# Patient Record
Sex: MALE | HISPANIC OR LATINO | URBAN - METROPOLITAN AREA
[De-identification: names, ages, dates, MRNs, and addresses within clinical notes are randomized per-mention and may not be internally consistent; named-entity substitution may affect disease eponyms.]

---

## 2019-12-07 ENCOUNTER — OFFICE VISIT (OUTPATIENT)
Dept: RETAIL CLINIC | Facility: CLINIC | Age: 24
End: 2019-12-07

## 2019-12-07 VITALS
DIASTOLIC BLOOD PRESSURE: 68 MMHG | HEIGHT: 66 IN | OXYGEN SATURATION: 97 % | SYSTOLIC BLOOD PRESSURE: 110 MMHG | BODY MASS INDEX: 24.11 KG/M2 | WEIGHT: 150 LBS | TEMPERATURE: 98.3 F | HEART RATE: 96 BPM | RESPIRATION RATE: 20 BRPM

## 2019-12-07 DIAGNOSIS — J02.0 ACUTE STREPTOCOCCAL PHARYNGITIS: Primary | ICD-10-CM

## 2019-12-07 LAB
EXPIRATION DATE: ABNORMAL
INTERNAL CONTROL: ABNORMAL
Lab: ABNORMAL
S PYO AG THROAT QL: POSITIVE

## 2019-12-07 PROCEDURE — 99213 OFFICE O/P EST LOW 20 MIN: CPT | Performed by: NURSE PRACTITIONER

## 2019-12-07 PROCEDURE — 87880 STREP A ASSAY W/OPTIC: CPT | Performed by: NURSE PRACTITIONER

## 2019-12-07 RX ORDER — ONDANSETRON 4 MG/1
4 TABLET, ORALLY DISINTEGRATING ORAL EVERY 8 HOURS PRN
Qty: 8 TABLET | Refills: 0 | Status: SHIPPED | OUTPATIENT
Start: 2019-12-07

## 2019-12-07 RX ORDER — AMOXICILLIN 500 MG/1
1000 TABLET, FILM COATED ORAL 2 TIMES DAILY
Qty: 40 TABLET | Refills: 0 | Status: SHIPPED | OUTPATIENT
Start: 2019-12-07 | End: 2019-12-17

## 2019-12-07 RX ORDER — BENZONATATE 200 MG/1
200 CAPSULE ORAL 3 TIMES DAILY PRN
Qty: 30 CAPSULE | Refills: 0 | Status: SHIPPED | OUTPATIENT
Start: 2019-12-07

## 2019-12-07 RX ORDER — GUAIFENESIN 600 MG/1
1200 TABLET, EXTENDED RELEASE ORAL 2 TIMES DAILY PRN
Qty: 40 TABLET | Refills: 0 | Status: SHIPPED | OUTPATIENT
Start: 2019-12-07 | End: 2019-12-17

## 2019-12-07 NOTE — PATIENT INSTRUCTIONS
Complete the full course of antibiotic, even if you are feeling better. Increase fluids and rest. Use tylenol or ibuprofen for the fever and pain. Eat a soft diet until the pain resolves. Use salt water gargles and throat losenges for pain relief. You are considered contagious for 24 hours following the start of antibiotics. Use good handwashing and do not allow anyone to drink after you. After 48 hours, you should replace your toothbrush or soak it in mouth wash to prevent reinfection. Seek immediate medical attention if the pain becomes severe, or you have trouble breathing, drooling, or great difficulty swallowing.       Dolor de garganta  Sore Throat  El dolor de garganta es dolor, ardor, irritación o sensación de picazón en la garganta. Cuando usted tiene dolor de garganta, puede sentir molestia o dolor en la garganta cuando traga o habla.  Muchas cosas pueden causar dolor de garganta, entre ellas:  · Infección.  · Alergias estacionales.  · La sequedad en el aire.  · Algunos irritantes, hali el humo o la contaminación.  · Tratamiento con radiación en la mya.  · Enfermedad de reflujo gastroesofágico (ERGE).  · Un tumor.  Generalmente, el dolor de garganta es el primer signo de otra enfermedad. Un dolor de garganta puede estar acompañado de otros síntomas, hali tos, estornudos, fiebre y ganglios hinchados en el adrianna. La mayor parte de los jimbo de garganta desaparecen sin tratamiento médico.  Siga estas indicaciones en toure casa:         · [place] los medicamentos de venta sayra solamente hali se lo haya indicado el médico.  ? Si el nataliia tiene dolor de garganta, no le administre aspirina, debido al riesgo de que contraiga el síndrome de [place].  · Aarti suficiente líquido para mantener la orina de color amarillo pálido.  · Descanse todo lo que sea necesario.  · Para ayudar a aliviar el dolor, intente lo siguiente:  ? Aarti líquidos calientes, hali caldos, infusiones o [place].  ? También puede comer o beber  líquidos fríos o congelados, tales hali paletas de hielo congelado.  ? Blanche gárgaras con heather mezcla de agua y sal 3 o 4 veces al día, o cuando sea necesario. Para preparar la mezcla de agua y sal, disuelva totalmente de ½ a 1 cucharadita (de 3 a 6 g) de sal en 1 taza (237 ml) de agua tibia.  ? Chupe caramelos duros o pastillas para la garganta.  ? Ponga un humidificador de vapor frío en la habitación por la noche para humedecer el aire.  ? También puede abrir el [place] de la ducha y sentarse en el baño con la magan cerrada raheem 5 a 10 minutos.  · No consuma ningún producto que contenga nicotina o tabaco, hali cigarrillos, cigarrillos electrónicos y tabaco de mascar. Si necesita ayuda para dejar de fumar, consulte al médico.  · Lávese otf las mag con agua y jabón frecuentemente. Use desinfectante para mag si no dispone de agua y jabón.  Comuníquese con un médico si:  · Tiene fiebre por más de 2 a 3 ryan.  · Tiene fiebre o síntomas que capone (son persistentes) más de 2 a 3 días.  · El dolor de garganta no mejora en el término de 7 ryan.  · Tiene fiebre y los síntomas empeoran repentinamente.  · Tiene un nataliia de 3 meses a 3 años de edad que presenta fiebre de 102.2 °F (39 °C) o más.  Solicite ayuda inmediatamente si:  · Tiene dificultad para respirar.  · No puede tragar líquidos, alimentos blandos o la saliva.  · Tiene más inflamación en la garganta o en el adrianna.  · Tiene náuseas o vómitos persistentes.  Resumen  · El dolor de garganta es dolor, ardor, irritación o sensación de picazón en la garganta. Muchas cosas pueden causar dolor de garganta.  · [place] los medicamentos de venta sayra solamente hali se lo haya indicado el médico. No le dé aspirina al nataliia.  · Aarti mucho líquido y blanche el reposo necesario.  · Comuníquese con un médico si los síntomas empeoran o si el dolor de garganta no mejora en el término de 7 días.  Esta información no tiene hali fin reemplazar el consejo del médico. Asegúrese de  hacerle al médico cualquier pregunta que tenga.  Document Released: 12/18/2006 Document Revised: 06/27/2019 Document Reviewed: 06/27/2019  [employer] Interactive Patient Education © 2019 [employer]    Dolor de garganta  Sore Throat  El dolor de garganta es dolor, ardor, irritación o sensación de picazón en la garganta. Cuando usted tiene dolor de garganta, puede sentir molestia o dolor en la garganta cuando traga o habla.  Muchas cosas pueden causar dolor de garganta, entre ellas:  · Infección.  · Alergias estacionales.  · La sequedad en el aire.  · Algunos irritantes, hali el humo o la contaminación.  · Tratamiento con radiación en la mya.  · Enfermedad de reflujo gastroesofágico (ERGE).  · Un tumor.  Generalmente, el dolor de garganta es el primer signo de otra enfermedad. Un dolor de garganta puede estar acompañado de otros síntomas, hali tos, estornudos, fiebre y ganglios hinchados en el adrianna. La mayor parte de los jimbo de garganta desaparecen sin tratamiento médico.  Siga estas indicaciones en toure casa:         · [place] los medicamentos de venta sayra solamente hali se lo haya indicado el médico.  ? Si el nataliia tiene dolor de garganta, no le administre aspirina, debido al riesgo de que contraiga el síndrome de [place].  · Aarti suficiente líquido para mantener la orina de color amarillo pálido.  · Descanse todo lo que sea necesario.  · Para ayudar a aliviar el dolor, intente lo siguiente:  ? Aarti líquidos calientes, hali caldos, infusiones o [place].  ? También puede comer o beber líquidos fríos o congelados, tales hali paletas de hielo congelado.  ? [place] gárgaras con heather mezcla de agua y sal 3 o 4 veces al día, o cuando sea necesario. Para preparar la mezcla de agua y sal, disuelva totalmente de ½ a 1 cucharadita (de 3 a 6 g) de sal en 1 taza (237 ml) de agua tibia.  ? Chupe caramelos duros o pastillas para la garganta.  ? Ponga un humidificador de vapor frío en la habitación por la noche para humedecer  el aire.  ? También puede abrir el [place] de la ducha y sentarse en el baño con la magan cerrada raheem 5 a 10 minutos.  · No consuma ningún producto que contenga nicotina o tabaco, hali cigarrillos, cigarrillos electrónicos y tabaco de mascar. Si necesita ayuda para dejar de fumar, consulte al médico.  · Lávese otf las mag con agua y jabón frecuentemente. Use desinfectante para mag si no dispone de agua y jabón.  Comuníquese con un médico si:  · Tiene fiebre por más de 2 a 3 ryan.  · Tiene fiebre o síntomas que capone (son persistentes) más de 2 a 3 días.  · El dolor de garganta no mejora en el término de 7 ryan.  · Tiene fiebre y los síntomas empeoran repentinamente.  · Tiene un nataliia de 3 meses a 3 años de edad que presenta fiebre de 102.2 °F (39 °C) o más.  Solicite ayuda inmediatamente si:  · Tiene dificultad para respirar.  · No puede tragar líquidos, alimentos blandos o la saliva.  · Tiene más inflamación en la garganta o en el adrianna.  · Tiene náuseas o vómitos persistentes.  Resumen  · El dolor de garganta es dolor, ardor, irritación o sensación de picazón en la garganta. Muchas cosas pueden causar dolor de garganta.  · [place] los medicamentos de venta sayra solamente hali se lo haya indicado el médico. No le dé aspirina al nataliia.  · Aarti mucho líquido y blanche el reposo necesario.  · Comuníquese con un médico si los síntomas empeoran o si el dolor de garganta no mejora en el término de 7 días.  Esta información no tiene hali fin reemplazar el consejo del médico. Asegúrese de hacerle al médico cualquier pregunta que tenga.  Document Released: 12/18/2006 Document Revised: 06/27/2019 Document Reviewed: 06/27/2019  [employer] Interactive Patient Education © 2019 [employer]    --Please utilize tylenol or ibuprofen as needed for fever or pain. Use as recommended.   -- Noted 8-10 eight ounce glasses of fluid a day. Nonalcoholic and noncafeinated beverages

## 2022-08-09 NOTE — PROGRESS NOTES
Subjective   Bradley Andersen is a 24 y.o. male.     PT has had a HA, fever, body ache, sore throat, nasal congestion, and chills for 3 days with a fever    Sore Throat    This is a new problem. The current episode started in the past 7 days. The problem has been gradually worsening. The pain is moderate. Associated symptoms include congestion (dry), coughing, ear pain (mild), headaches, a hoarse voice, swollen glands, trouble swallowing (has resolved) and vomiting (3). Pertinent negatives include no abdominal pain, diarrhea, drooling, ear discharge, plugged ear sensation, neck pain, shortness of breath or stridor. He has had no exposure to strep or mono. He has tried acetaminophen and NSAIDs for the symptoms. The treatment provided no relief.        No current outpatient medications on file prior to visit.     No current facility-administered medications on file prior to visit.        No Known Allergies    History reviewed. No pertinent past medical history.    History reviewed. No pertinent surgical history.    History reviewed. No pertinent family history.    Social History     Socioeconomic History   • Marital status: Single     Spouse name: Not on file   • Number of children: Not on file   • Years of education: Not on file   • Highest education level: Not on file   Tobacco Use   • Smoking status: Never Smoker   • Smokeless tobacco: Never Used   Substance and Sexual Activity   • Alcohol use: Never     Frequency: Never   • Drug use: Never   • Sexual activity: Defer       Review of Systems   Constitutional: Positive for chills, fatigue and fever. Negative for activity change, appetite change and diaphoresis.   HENT: Positive for congestion (dry), ear pain (mild), hoarse voice, postnasal drip, rhinorrhea, sinus pressure, sinus pain, sneezing, sore throat and trouble swallowing (has resolved). Negative for drooling and ear discharge.    Eyes: Negative for pain, discharge, redness and itching.   Respiratory: Positive  "for cough. Negative for chest tightness, shortness of breath and stridor.    Cardiovascular: Negative for chest pain.   Gastrointestinal: Positive for nausea and vomiting (3). Negative for abdominal pain and diarrhea.   Musculoskeletal: Positive for myalgias. Negative for arthralgias and neck pain.   Skin: Negative for color change and rash.   Allergic/Immunologic: Negative for environmental allergies.   Neurological: Positive for headaches.   Psychiatric/Behavioral: Negative for agitation.       /68   Pulse 96   Temp 98.3 °F (36.8 °C) (Oral)   Resp 20   Ht 167.6 cm (66\")   Wt 68 kg (150 lb)   SpO2 97%   BMI 24.21 kg/m²     Objective   Physical Exam   Constitutional: He is oriented to person, place, and time. He appears well-developed and well-nourished. He is cooperative. He appears ill.   HENT:   Head: Normocephalic and atraumatic.   Right Ear: Tympanic membrane, external ear and ear canal normal. There is tenderness.   Left Ear: Tympanic membrane, external ear and ear canal normal. There is tenderness.   Nose: Mucosal edema and rhinorrhea present. Right sinus exhibits maxillary sinus tenderness. Right sinus exhibits no frontal sinus tenderness. Left sinus exhibits maxillary sinus tenderness. Left sinus exhibits no frontal sinus tenderness.   Mouth/Throat: Uvula is midline and mucous membranes are normal. Posterior oropharyngeal erythema present. No oropharyngeal exudate or posterior oropharyngeal edema. Tonsils are 3+ on the right. Tonsils are 3+ on the left. No tonsillar exudate.   Eyes: Conjunctivae and lids are normal.   Cardiovascular: Normal heart sounds.   Pulmonary/Chest: Effort normal and breath sounds normal.   Abdominal: Soft. Bowel sounds are increased. There is no tenderness.   Lymphadenopathy:     He has cervical adenopathy.   Neurological: He is alert and oriented to person, place, and time.   Skin: Skin is warm and dry. No rash noted.   Psychiatric: He has a normal mood and affect. His " speech is normal and behavior is normal.       Procedures None    Assessment/Plan   Diagnoses and all orders for this visit:    Acute streptococcal pharyngitis  -     POC Rapid Strep A    Other orders  -     amoxicillin (AMOXIL) 500 MG tablet; Take 2 tablets by mouth 2 (Two) Times a Day for 10 days.  -     benzonatate (TESSALON) 200 MG capsule; Take 1 capsule by mouth 3 (Three) Times a Day As Needed for Cough.  -     ondansetron ODT (ZOFRAN-ODT) 4 MG disintegrating tablet; Take 1 tablet by mouth Every 8 (Eight) Hours As Needed for Nausea or Vomiting.  -     guaiFENesin (MUCINEX) 600 MG 12 hr tablet; Take 2 tablets by mouth 2 (Two) Times a Day As Needed for Cough for up to 10 days.        Results for orders placed or performed in visit on 12/07/19   POC Rapid Strep A   Result Value Ref Range    Rapid Strep A Screen Positive (A) Negative, VALID, INVALID, Not Performed    Internal Control Passed Passed    Lot Number [id number]     Expiration Date 3/2,022      -dec;judie influenza swab d/t insurance/ cost  Follow up with PCP or go to the nearest emergency room if symptoms worsen or fail to improve.   High Dose Vitamin A Counseling: Side effects reviewed, pt to contact office should one occur.